# Patient Record
(demographics unavailable — no encounter records)

---

## 2024-12-09 NOTE — HISTORY OF PRESENT ILLNESS
[postmenopausal] : postmenopausal [Menarche Age: ____] : age at menarche was [unfilled] [N] : Patient does not use contraception [Y] : Patient is sexually active [___] : #2 ([unfilled]): [Pregnancy History] : girl [TextBox_19] : hx of BL mastectomy [PapSmeardate] : 11/20/2023 [TextBox_31] : WNL  [BoneDensityDate] : 03/15/2024 [TextBox_37] : WNL  [ColonoscopyDate] : 05/28/2024 [GonorrheaDate] : n/a [ChlamydiaDate] : n/a [HPVDate] : 11/20/2023 [TextBox_78] : neg  [LMPDate] : 11/2012 [PGHxTotal] : 3 [[facility]Full[facility]] : 2 [[facility]Living] : 2 [PGHxABSpont] : 1 [FreeTextEntry1] : 11/2012 [Currently Active] : currently active [Men] : men [No] : No

## 2025-01-07 NOTE — HEALTH RISK ASSESSMENT
[0] : 2) Feeling down, depressed, or hopeless: Not at all (0) [PHQ-2 Negative - No further assessment needed] : PHQ-2 Negative - No further assessment needed [IXG4Krxoc] : 0 [Former] : Former [10-14] : 10-14 [> 15 Years] : > 15 Years [NO] : No [Patient declined Low Dose CT Scan] : Patient declined Low Dose CT Scan [Patient declined Retinal Exam] : Patient declined Retinal Exam. [Patient reported colonoscopy was normal] : Patient reported colonoscopy was normal [HIV test declined] : HIV test declined [Hepatitis C test declined] : Hepatitis C test declined [ColonoscopyDate] : 05/24

## 2025-01-07 NOTE — PLAN
[FreeTextEntry1] : In regards to patients Physical exam, routine blood work ordered, will review results with patient.  History of breast cancer- patient will follow with Heme/ONC Dr. Morales at [facility]  CAD- patient will continue medication as prescribed by Cardiologist  HTN- patient's BP well controlled with current medication. will continue current  regimen   Patient will follow with Dermatologist Dr. Smith.  Counseling included abnormal lab results, differential diagnoses, treatment options, risks and benefits, lifestyle changes, current condition, medications, and dose adjustments.  The patient was interactive, attentive, asked questions, and verbalized understanding

## 2025-01-07 NOTE — HISTORY OF PRESENT ILLNESS
[FreeTextEntry1] : Physical exam [de-identified] : Ms. JULIÁN GRIDER is a 62 year female with a PMH of  HTN, HLD, family history CAD, former smoker, obesity, ovarian cyst, breast cancer s/p chemotherapy, mastectomy and radiation (Patient follows with Heme/Onc in [facility]) comes to the office for physical exam. Patient denies fever, cough SOB. No other complaints at this time.

## 2025-02-12 NOTE — PHYSICAL EXAM
[Well Developed] : well developed [Well Nourished] : well nourished [No Acute Distress] : no acute distress [Normal Conjunctiva] : normal conjunctiva [Normal Venous Pressure] : normal venous pressure [No Carotid Bruit] : no carotid bruit [Normal S1, S2] : normal S1, S2 [No Murmur] : no murmur [No Rub] : no rub [No Gallop] : no gallop [Clear Lung Fields] : clear lung fields [Good Air Entry] : good air entry [No Respiratory Distress] : no respiratory distress  [Soft] : abdomen soft [Non Tender] : non-tender [Normal Gait] : normal gait [No Edema] : no edema [No Cyanosis] : no cyanosis [No Clubbing] : no clubbing [Moves all extremities] : moves all extremities [No Focal Deficits] : no focal deficits [Normal Speech] : normal speech [Alert and Oriented] : alert and oriented [Normal memory] : normal memory

## 2025-02-13 NOTE — ASSESSMENT
[FreeTextEntry1] : EKG: SR, low voltage, LAE    HDL 47 LDL 98  (1/2025)   HDL 45 LDL 90  (9/2024)  HDL 56 LDL 49 TG 95 (2/2024)  HDL 61 LDL 76  (8/2023)  HDL 58   (3/2023)  CT CAC 3/2023: 1. total = 120 2. LAD = 35 3. LCX = 85  ECHO 4/2023: 1. Mild LVH, EF 55-60% 2. Grade I diastolic dysfunction 3. Normal RV/LA/RA 4. Trivial MR  PHARM NUCLEAR STRESS 3/2023: 1. Negative for ischemia/infarct, EF 72% 2. Normal BP response.

## 2025-02-13 NOTE — HISTORY OF PRESENT ILLNESS
[FreeTextEntry1] : JULIÁN GRIDER is a 62 year-old  F with hx of HTN, HLD, family hx of CAD, former smoker, obesity, breast cancer s/p chemotherapy, mastectomy and RT presents here for cardiac follow-up. Has been cancer free. Has had issues with significant skin burn and esophagitis from RT but recovered.   No CP/SOB. Patient denies PND/orthopnea/edema/palpitations/syncope/claudication. Awaiting prior auth for Zepbound, has not started . Lingering congestion from recent URI.   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 Enjoys camping, tries to walk as much as she can. Swims and walks on beach in summers. Has 2 daughters (one is cardiac ICU nurse at [employer], other has chronic medical issues/learning disabilities)

## 2025-02-13 NOTE — HISTORY OF PRESENT ILLNESS
[FreeTextEntry1] : JULIÁN GRIDER is a 62 year-old  F with hx of HTN, HLD, family hx of CAD, former smoker, obesity, breast cancer s/p chemotherapy, mastectomy and RT presents here for cardiac follow-up. Has been cancer free. Has had issues with significant skin burn and esophagitis from RT but recovered.   No CP/SOB. Patient denies PND/orthopnea/edema/palpitations/syncope/claudication. Awaiting prior auth for Zepbound, has not started . Lingering congestion from recent URI.   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 Enjoys camping, tries to walk as much as she can. Swims and walks on beach in summers. Has 2 daughters (one is cardiac ICU nurse at [employer], other has chronic medical issues/learning disabilities)

## 2025-02-13 NOTE — DISCUSSION/SUMMARY
[EKG obtained to assist in diagnosis and management of assessed problem(s)] : EKG obtained to assist in diagnosis and management of assessed problem(s) [FreeTextEntry1] : Patient is a 61yo F with HTN, HLD, family history CAD, former smoker, obesity, ovarian cyst, breast cancer s/p chemotherapy, mastectomy and radiation here for cardiac follow up.   CAC: -Multiple risk factors for CAD (family history, breast cancer, radiation, former smoker, HTN, HLD, SLE). -Mild-moderate CAC of 120 done 3/2023 without ischemia on nuclear stress 3/2023.  -No anginal symptoms. EKG stable. Med management   HLD: -Has not tolerated rosuvastatin and atorvastatin due to muscle cramps in past -Lipids significantly improved 8/2023 Repatha but unable to afford, put on Nexletol. LDL on Nexletol was 90 (would recommend lowering LDL to below 70 given CAC). Patient reports joint pain since starting Nexletol. Also being evaluated for autoimmune disease, possibly contributing to joint pain.  -Back on NExletol now, a bit less shoulder/joint pain, Pitavastatin not covered -Lipids fairly well controlled from 1/2025, ideally LDL < 70  HTN -On  Losartan/Metoprolol, BP mildly high but on dayquil .   Borderline DM -[place] to get weight down, awaiting prior auth for Zepbound  1. CV stable, continue aggressive risk factor modification 2. Increase physical activity as tolerated, needs to be more active and get weight down 3. Starting Zepbound once approved, will hold off adding BP meds and expect further reduction in lipids with weight loss 4. Recommend aggressive diet and lifestyle modifications 5.Follow up 3months to assess response to Zepbound and recheck BP

## 2025-05-13 NOTE — HISTORY OF PRESENT ILLNESS
[FreeTextEntry1] : Patient is a 62 year-old  F with hx of HTN, HLD, family hx of CAD, former smoker, obesity, breast cancer s/p chemotherapy, mastectomy and RT presents here for cardiac follow-up. Has been cancer free. Has had issues with significant skin burn and esophagitis from RT but recovered.   No CP/SOB. Patient denies PND/orthopnea/edema/palpitations/syncope/claudication. Started on Zepbound and has been losing weight. Up tp 5mg weekly now. HAs big gala event tomorrow and some stress lately. Still with joint pains in knees/ankles/shoulders/neck. Pain better after moving/walking, worse with sitting/rest.   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 Enjoys camping, tries to walk as much as she can. Swims and walks on beach in summers. Has 2 daughters (one is cardiac ICU nurse at [employer], other has chronic medical issues/learning disabilities)

## 2025-05-13 NOTE — DISCUSSION/SUMMARY
[EKG obtained to assist in diagnosis and management of assessed problem(s)] : EKG obtained to assist in diagnosis and management of assessed problem(s) [FreeTextEntry1] : Patient is a 63yo F with HTN, HLD, family history CAD, former smoker, obesity, ovarian cyst, breast cancer s/p chemotherapy, mastectomy and radiation here for cardiac follow up.   CAC: -Multiple risk factors for CAD (family history, breast cancer, radiation, former smoker, HTN, HLD, SLE). -Mild-moderate CAC of 120 done 3/2023 without ischemia on nuclear stress 3/2023.  -No anginal symptoms. EKG stable. Med management   HLD: -Has not tolerated rosuvastatin and atorvastatin due to muscle cramps in past -Lipids significantly improved 8/2023 on Repatha but unable to afford, put on Nexletol. LDL on Nexletol was 90 (would recommend lowering LDL to below 70 given CAC). Patient reports joint pain since starting Nexletol. Also being evaluated for autoimmune disease, possibly contributing to joint pain.  -Back on NExletol now, a bit less shoulder/joint pain, Pitavastatin not covered by insurance to try -Lipids fairly well controlled from 1/2025, ideally LDL < 70 -Continued joint pains, unlikely Nexletol, will be seeing rheumatology in coming months. Check CPK  HTN -On Losartan/Metoprolol, BP mildly elevated today but anxious, continue diet/weight loss/exercise  Borderline DM/OBESITY -Goal to get weight down, on Zepbound now as well -Down 10 pounds, up to 5mg Zepbound and will increase to 7.5mg weekly now  1. CV stable, continue aggressive risk factor modification 2. Increase physical activity as tolerated, needs to be more active and get weight down 3. Increase Zepbound to 7.5mg weekly, BW still pending. Will add CPK as noted above 4. Recommend aggressive diet and lifestyle modifications 5. Rheum eval pending in coming months, continue Nexletol 6. Continue BP meds, re-assess at follow up

## 2025-05-13 NOTE — ASSESSMENT
From: Constance Cardoso  To: Louis Whitney  Sent: 5/18/2021 9:05 AM CDT  Subject: Medication Question    Hello - can you please send a prescription for my Tylenol 3 to the pharmacy at the [facility]? It's not listed on my medication list any more. I think Dr. Jacobson's office took it off by accident at my last appointment.     Thanks!   [FreeTextEntry1] : EKG: SR, leftward axis, PRWP    HDL 47 LDL 98  (1/2025)   HDL 45 LDL 90  (9/2024)  HDL 56 LDL 49 TG 95 (2/2024)  HDL 61 LDL 76  (8/2023)  HDL 58   (3/2023)  CT CAC 3/2023: 1. total = 120 2. LAD = 35 3. LCX = 85  ECHO 4/2023: 1. Mild LVH, EF 55-60% 2. Grade I diastolic dysfunction 3. Normal RV/LA/RA 4. Trivial MR  PHARM NUCLEAR STRESS 3/2023: 1. Negative for ischemia/infarct, EF 72% 2. Normal BP response.

## 2025-07-15 NOTE — PHYSICAL EXAM
1.  You were seen in the ENT Clinic today by . If you have any questions or concerns after your appointment, please call [phone]. Press option #1 for scheduling related needs. Press option #3 for Nurse advice.    2. Return to clinic in 2 months    Anh Trujillo LPN  [phone]  [facility] Otolaryngology        [General Appearance - Alert] : alert [General Appearance - In No Acute Distress] : in no acute distress [General Appearance - Well Nourished] : well nourished [General Appearance - Well Developed] : well developed [Sclera] : the sclera and conjunctiva were normal [Outer Ear] : the ears and nose were normal in appearance [Neck Appearance] : the appearance of the neck was normal [] : no rash [No Focal Deficits] : no focal deficits [Oriented To Time, Place, And Person] : oriented to person, place, and time

## 2025-07-15 NOTE — ASSESSMENT
[FreeTextEntry1] : Chronic hx of polyarthralgia's Intermittent rash on hands Possible psoriasis on scalp and forehead Hx of +SSB AB  Prior work up in 2022 unrevealing  - labs and imaging as below - conservative treatment of the patient's condition- including rest, ice, heat, anti-inflammatory medications, activity modifications, home stretching and strengthening exercises daily. - c/w dermatology follow up and recommendations  Discussed treatment plan with the patient. The patient was given the opportunity to ask questions and all questions were answered to their satisfaction.  Otc Regimen: Panoxyl wash Detail Level: Zone Continue Regimen: Clindamycin lotion twice a day Plan: avoid occlusion- -change after exercise, breathable clothing Plan: discussed topical tx options-- pt declines for now\\nSPF, sun avoidance discussed

## 2025-07-15 NOTE — HISTORY OF PRESENT ILLNESS
[FreeTextEntry1] : 62 year old female with PMH as listed below presents today for an initial evaluation for joint pain  - Extensive FHx of SLE, RA in sisters, mom w/ MS, and daughter w/ primary Raynaud's - Reports to have chronic hx of scaling rash on hands, upper eyelids, brow, and forehead. Rash comes and goes.  - She had previous eval with dermatologist Dr. Jovita Smith, labs with mild +SSB 1.7 negative REYES, dsDNA, ENAs (RNP and Sm), and neg SSA.  - Told rash on upper eyelids- likely psoriasis? Rashj on hands/ forehead- unclear dx.  - Seen by Dr. Randolph in 2022 with further work up unremarkable - Reports chronic fatigue, dry eyes, dry mouth, photosensitivity, hair thinning.  - Reports rash occurs in cycles. Recurrent, on hands, characterized by bumps that become blister-like. - Episodes lasting approximately 3-4 months, followed by periods of remission. One remission period of 3 months reported this year (December, January, February). - Rash is itchy, described as similar to a hive, with some relief from Benadryl. - Also with intermittent joint pain and morning stiffness to her joints 30 mins+ - Mild swelling to her hands and legs in AM  PMH: HTN, HLD, family history CAD, breast cancer s/p chemotherapy, mastectomy and radiation  Labs. chart reviewed

## 2025-07-29 NOTE — ASSESSMENT
[FreeTextEntry1] : Chronic hx of polyarthralgia's Intermittent rash on hands Possible psoriasis on scalp and forehead Hx of +SSB AB  Prior work up in 2022 unrevealing Recent labs with +REYES, +SSB: 1.3 otherwise labs unremarkable   - conservative treatment of the patient's condition- including rest, ice, heat, anti-inflammatory medications, activity modifications, home stretching and strengthening exercises daily. - c/w dermatology follow up and recommendations. consider biopsy vs patch testing  Discussed treatment plan with the patient. The patient was given the opportunity to ask questions and all questions were answered to their satisfaction.

## 2025-07-29 NOTE — PHYSICAL EXAM
[General Appearance - Alert] : alert [General Appearance - In No Acute Distress] : in no acute distress [General Appearance - Well Nourished] : well nourished [General Appearance - Well Developed] : well developed [Sclera] : the sclera and conjunctiva were normal [Outer Ear] : the ears and nose were normal in appearance [Neck Appearance] : the appearance of the neck was normal [] : no rash [No Focal Deficits] : no focal deficits [Oriented To Time, Place, And Person] : oriented to person, place, and time

## 2025-07-29 NOTE — HISTORY OF PRESENT ILLNESS
[Home] : at home, [unfilled] , at the time of the visit. [Medical Office: ([facility])___] : at the medical office located in  [Telehealth (audio & video)] : This visit was provided via telehealth using real-time 2-way audio visual technology. [Verbal consent obtained from patient] : the patient, [unfilled] [FreeTextEntry1] : Pt presenting today for a f.u visit for abnormal labs.  Recent labs from 07/2025 reviewed with pt in detail. +REYES, +SSB otherwise labs unremarkable.  ROS otherwise unchanged from LV.